# Patient Record
Sex: FEMALE | URBAN - METROPOLITAN AREA
[De-identification: names, ages, dates, MRNs, and addresses within clinical notes are randomized per-mention and may not be internally consistent; named-entity substitution may affect disease eponyms.]

---

## 2020-02-03 ENCOUNTER — IMPORTED ENCOUNTER (OUTPATIENT)
Dept: URBAN - METROPOLITAN AREA CLINIC 1 | Facility: CLINIC | Age: 25
End: 2020-02-03

## 2020-02-03 PROBLEM — B30.9: Noted: 2020-02-03

## 2020-02-03 PROBLEM — H17.823: Noted: 2020-02-03

## 2020-02-03 PROBLEM — H16.002: Noted: 2020-02-03

## 2020-02-03 PROCEDURE — 92002 INTRM OPH EXAM NEW PATIENT: CPT

## 2020-02-03 NOTE — PATIENT DISCUSSION
1.  Corneal ulcer/abrasion OS with possible viral component secondary to questionable () HERNANDEZ -- Likely from sleeping in CTLs/CTL overuse. Begin Besivance QID OS (sample and Written Rx given with GoodRx card). Written Rx given for Vigamox if cost is too much with Besivance. Return immediately if ulcer larger or symptoms worsen. ***No contact lens use until advised***2.  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 Return for an appointment in Thursday for a 10 with Dr. Bennett Christian.

## 2020-02-03 NOTE — PATIENT DISCUSSION
Viral Conjunctivitis OS -- The condition was discussed with the patient. Cool compresses and artificial tears were recommended. The mechanism of transmission was explained and the patient was educated to wash hands and use separate towels and bedding.

## 2021-03-04 ENCOUNTER — HOSPITAL ENCOUNTER (OUTPATIENT)
Dept: PHYSICAL THERAPY | Age: 26
Discharge: HOME | End: 2021-03-04
Payer: MEDICAID

## 2021-03-04 PROCEDURE — 97162 PT EVAL MOD COMPLEX 30 MIN: CPT

## 2021-03-04 NOTE — PROGRESS NOTES
PHYSICAL THERAPY - DAILY TREATMENT NOTE    Patient Name: Shreyas Lewis        Date: 3/4/2021  : 1995   yes Patient  Verified  Visit #:      of   12  Insurance: Payor: BLUE CROSS MEDICAID / Plan: [facility] HEALTHKEEPERS PLUS / Product Type: Managed Care Medicaid /      In time: 390 Out time: 615   Total Treatment Time: 45     Medicare/BCBS Time Tracking (below)   Total Timed Codes (min):  na 1:1 Treatment Time:  na     TREATMENT AREA =  Low back pain [M54.5]    SUBJECTIVE  Pain Level (on 0 to 10 scale):  7  / 10   Medication Changes/New allergies or changes in medical history, any new surgeries or procedures?    no  If yes, update Summary List   Subjective Functional Status/Changes:  []  No changes reported     SEE POC         OBJECTIVE    45 min Evaluation       Billed With/As:   [] TE   [] TA   [] Neuro   [] Self Care Patient Education: [x] Review HEP    [] Progressed/Changed HEP based on:   [] positioning   [] body mechanics   [] transfers   [] heat/ice application    [] other:      Other Objective/Functional Measures:    SEE POC     Post Treatment Pain Level (on 0 to 10) scale:   7  / 10     ASSESSMENT  Assessment/Changes in Function:     Evaluation Code Complexity: History MEDIUM  Complexity : 1-2 comorbidities / personal factors will impact the outcome/ POC ; Examination HIGH Complexity : 4+ Standardized tests and measures addressing body structure, function, activity limitation and / or participation in recreation ; Presentation MEDIUM Complexity : Evolving with changing characteristics ; Decision Making MEDIUM Complexity : FOTO score of 26-74;  Complexity MEDIUM    Justification for Eval Code Complexity:  Patient History : Depression, Pregnancy  Examination SEE ABOVE EXAM  Clinical Presentation: evolving pain  Clinical Decision Making : TQQL 41         []  See Progress Note/Recertification   Patient will continue to benefit from skilled PT services to modify and progress therapeutic interventions, address functional mobility deficits, address ROM deficits, address strength deficits, analyze and address soft tissue restrictions, analyze and cue movement patterns, analyze and modify body mechanics/ergonomics and assess and modify postural abnormalities to attain remaining goals. Progress toward goals / Updated goals:    SEE POC     PLAN  []  Upgrade activities as tolerated yes Continue plan of care   []  Discharge due to :    [x]  Other: Pt will be seen 2 times per week for 12 sessions.       Therapist: Rick Ang PT, DPT    Date: 3/4/2021 Time: 6:26 PM     Future Appointments   Date Time Provider Jodie Acevedo   3/12/2021 10:15 AM Carolyn Leon [facility]   3/16/2021  5:15 PM Radhika Martines, PT [facility]   3/23/2021  6:00 PM Radhika Martines PT [facility]   3/26/2021 10:15 AM Carolyn Loen [facility]   3/30/2021  5:15 PM Radhika Martines, PT [facility]   4/2/2021 10:15 AM Carolyn Leon [facility]   4/6/2021  5:15 PM Radhika Martines, PT [facility]   4/9/2021 10:15 AM Kevyn Howe, PTA [address]

## 2021-03-12 ENCOUNTER — HOSPITAL ENCOUNTER (OUTPATIENT)
Dept: PHYSICAL THERAPY | Age: 26
Discharge: HOME | End: 2021-03-12
Payer: MEDICAID

## 2021-03-12 PROCEDURE — 97530 THERAPEUTIC ACTIVITIES: CPT

## 2021-03-12 PROCEDURE — 97110 THERAPEUTIC EXERCISES: CPT

## 2021-03-12 PROCEDURE — 97112 NEUROMUSCULAR REEDUCATION: CPT

## 2021-03-12 NOTE — PROGRESS NOTES
PHYSICAL THERAPY - DAILY TREATMENT NOTE    Patient Name: Aundrea Hernández        Date: 3/12/2021  : 1995   yes Patient  Verified  Visit #:     Insurance: Payor: Marianna Katz / Plan: Caroline Miki PLUS / Product Type: Managed Care Medicaid /      In time:  Out time:    Total Treatment Time: 60     Medicare/BCBS Time Tracking (below)   Total Timed Codes (min):  50 1:1 Treatment Time:       TREATMENT AREA =  Low back pain [M54.5]    SUBJECTIVE  Pain Level (on 0 to 10 scale):  6  / 10   Medication Changes/New allergies or changes in medical history, any new surgeries or procedures?    no  If yes, update Summary List   Subjective Functional Status/Changes:  []  No changes reported     Pt reporting pain in the low back, (B) glutes, then into the (R) posterior thigh. OBJECTIVE  Modalities Rationale:     decrease pain and increase tissue extensibility to improve patient's ability to perform pain free ADLs. min [] Estim, type/location:                                      []  att     []  unatt     []  w/US     []  w/ice    []  w/heat    min []  Mechanical Traction: type/lbs                   []  pro   []  sup   []  int   []  cont    []  before manual    []  after manual    min []  Ultrasound, settings/location:      min []  Iontophoresis w/ dexamethasone, location:                                               []  take home patch       []  in clinic   10 min []  Ice     [x]  Heat    location/position: Semi-reclined, lumbar.      min []  Vasopneumatic Device, press/temp:     min []  Other:    [x] Skin assessment post-treatment (if applicable):    [x]  intact    []  redness- no adverse reaction     []redness  adverse reaction:        20 min Therapeutic Exercise:  [x]  See flow sheet   Rationale:      increase ROM, increase strength, improve coordination and improve balance to improve the patients ability to perform pain free ADLs.      7 (NB) Min Manual Therapy: STM/DTM (B) lumbar paraspinals in (R) sidelying. Rationale:      decrease pain, increase ROM, increase tissue extensibility and decrease trigger points to improve patient's ability to perform pain free ADLs. The manual therapy interventions were performed at a separate and distinct time from the therapeutic activities interventions. 10 min Therapeutic Activity: [x]  See flow sheet   Rationale:    increase strength, improve coordination, improve balance and increase proprioception to improve the patients ability to perform pain free ADLs. 13 min Neuromuscular Re-ed: [x]  See flow sheet   Rationale:    increase ROM, increase strength, improve coordination, improve balance and increase proprioception to improve the patients ability to perform pain free ADLs. Billed With/As:   [x] TE   [] TA   [] Neuro   [] Self Care Patient Education: [x] Review HEP    [] Progressed/Changed HEP based on:   [] positioning   [] body mechanics   [] transfers   [] heat/ice application    [] other:      Other Objective/Functional Measures: Therex per flow sheet. Post Treatment Pain Level (on 0 to 10) scale:   4  / 10     ASSESSMENT  Assessment/Changes in Function:     No exacerbation of symptoms with today's session. []  See Progress Note/Recertification   Patient will continue to benefit from skilled PT services to modify and progress therapeutic interventions, address functional mobility deficits, address ROM deficits, address strength deficits, analyze and address soft tissue restrictions, analyze and cue movement patterns, analyze and modify body mechanics/ergonomics and assess and modify postural abnormalities to attain remaining goals. Progress toward goals / Updated goals:    No change in progress toward LTG's with today's session.       PLAN  [x]  Upgrade activities as tolerated yes Continue plan of care   []  Discharge due to :    []  Other:      Therapist: Janny Meza PTA    Date: 3/12/2021 Time: 10:25 AM     Future Appointments   Date Time Provider Jodie Acevedo   3/16/2021  5:15 PM Adrien Cancino PT [facility]   3/23/2021  6:00 PM Adrien Cancino PT [facility]   3/26/2021 10[facility]15 [facility]   3/30/2021  5:15 PM Adrien Cancino PT [facility]   4/2/2021 10:15 AM Farhana [facility]   4/6/2021  5:15 PM Adrien Cancino PT [facility]   4/9/2021 10:15 AM Deborra Lesch, PTA [id number]

## 2021-03-16 ENCOUNTER — HOSPITAL ENCOUNTER (OUTPATIENT)
Dept: PHYSICAL THERAPY | Age: 26
Discharge: HOME | End: 2021-03-16
Payer: MEDICAID

## 2021-03-16 PROCEDURE — 97112 NEUROMUSCULAR REEDUCATION: CPT

## 2021-03-16 PROCEDURE — 97110 THERAPEUTIC EXERCISES: CPT

## 2021-03-16 PROCEDURE — 97530 THERAPEUTIC ACTIVITIES: CPT

## 2021-03-16 NOTE — PROGRESS NOTES
PHYSICAL THERAPY - DAILY TREATMENT NOTE    Patient Name: Tony Memory        Date: 3/16/2021  : 1995   yes Patient  Verified  Visit #:   3   of   12  Insurance: Payor: BLUE CROSS MEDICAID / Plan: [facility] [employer]KEEPERS PLUS / Product Type: Managed Care Medicaid /      In time: 5:15 Out time: 6:05   Total Treatment Time: 50     Medicare/BCBS Time Tracking (below)   Total Timed Codes (min):  50 1:1 Treatment Time:  50     TREATMENT AREA =  Low back pain [M54.5]    SUBJECTIVE  Pain Level (on 0 to 10 scale):  5  / 10 \"soreness in the low back\"   Medication Changes/New allergies or changes in medical history, any new surgeries or procedures?    no  If yes, update Summary List   Subjective Functional Status/Changes:  []  No changes reported     Pt reporting pain in the low back, (B) glutes, then into the (R) posterior thigh. OBJECTIVE  Modalities Rationale:     decrease pain and increase tissue extensibility to improve patient's ability to perform pain free ADLs. min [] Estim, type/location:                                      []  att     []  unatt     []  w/US     []  w/ice    []  w/heat    min []  Mechanical Traction: type/lbs                   []  pro   []  sup   []  int   []  cont    []  before manual    []  after manual    min []  Ultrasound, settings/location:      min []  Iontophoresis w/ dexamethasone, location:                                               []  take home patch       []  in clinic   10 min []  Ice     [x]  Heat    location/position: During table drills, lumbar.      min []  Vasopneumatic Device, press/temp:     min []  Other:    [x] Skin assessment post-treatment (if applicable):    [x]  intact    []  redness- no adverse reaction     []redness - adverse reaction:        20 min Therapeutic Exercise:  [x]  See flow sheet   Rationale:      increase ROM, increase strength, improve coordination and improve balance to improve the patients ability to perform pain free ADLs.     15 min Neuromuscular Re-ed: [x]  See flow sheet   Rationale:    improve coordination to improve the patients ability to move legs/hips to perform bed mobility and transfers     5 (NB) Min Manual Therapy: STM/DTM (B) lumbar paraspinals in (R) sidelying. Rationale:      decrease pain, increase ROM, increase tissue extensibility and decrease trigger points to improve patient's ability to perform pain free ADLs. The manual therapy interventions were performed at a separate and distinct time from the therapeutic activities interventions. 10 min Therapeutic Activity: [x]  See flow sheet   Rationale:    increase strength, improve coordination, improve balance and increase proprioception to improve the patients ability to perform bed transfers, chair transfers    Billed With/As:   [x] TE   [] TA   [] Neuro   [] Self Care Patient Education: [x] Review HEP    [] Progressed/Changed HEP based on:   [] positioning   [] body mechanics   [] transfers   [] heat/ice application    [] other:      Other Objective/Functional Measures:    TTP to bilateral superior glutes, just lateral to PSIS. See Flowsheet for drills, loads and volume. Post Treatment Pain Level (on 0 to 10) scale:   5  / 10     ASSESSMENT  Assessment/Changes in Function:     Pt with poor tolerance to hip motions and reporting anterior pelvic and posterior pelvic (PSIS/SIJ area) pain. Supine piriformis and LTRs reported as improving to pt status. Gave pt tband for HEP. Tender points noted at mid right glute and piriformis area which subsided with prolonged pressure. Instructed pt in using pool as way to reduce stresses on the body. She was receptive.      []  See Progress Note/Recertification   Patient will continue to benefit from skilled PT services to modify and progress therapeutic interventions, address functional mobility deficits, address ROM deficits, address strength deficits, analyze and address soft tissue restrictions, analyze and cue movement patterns, analyze and modify body mechanics/ergonomics and assess and modify postural abnormalities to attain remaining goals. Progress toward goals / Updated goals: · Short Term Goals: To be accomplished in  6  treatments:  Pt will be I with HEP in order to improve carryover from therapy.: MET: per pt. Pt will report max pain <5/10 in the lumbar spine in order to improve sleep and tolerance with functional ADL's  · Long Term Goals: To be accomplished in  12  treatments:  Pt will report 50% improvement with sleeping in order to improve QOL  Pt will reports <3/10 max pain of the L/S in order to improve functional ADL's  Pt will be able to perform sit to stand transfers with no use of UE support in order to get in and out of chairs at doctors appointments. Progressing, uses both hands for table transfer, 1 UE for chair  Pt will be I with long term HEP in order to perform self management of symptoms at home.        PLAN  [x]  Upgrade activities as tolerated yes Continue plan of care   []  Discharge due to :    []  Other:      Therapist: Dwaine Vu PT    Date: 3/16/2021 Time: 10:25 AM     Future Appointments   Date Time Provider Jodie Acevedo   3/23/2021  6:00 PM Safia Callahan  [address] [facility]   3/26/2021 10:15 AM Levistephanie Monteiro [address] [facility]   3/30/2021  5:15 PM Safia Callahan  [address] [facility]   4/2/2021 10:15 AM Levi Thania [address] [facility]   4/6/2021  5:15 PM Safia Callahan  [address] [facility]   4/9/2021 10:15 AM Jarvis Nyhan, PTA [address]

## 2021-03-23 ENCOUNTER — APPOINTMENT (OUTPATIENT)
Dept: PHYSICAL THERAPY | Age: 26
End: 2021-03-23
Payer: MEDICAID

## 2021-04-07 NOTE — PROGRESS NOTES
[facility] PHYSICAL THERAPY  [address] [place] [address] Beatriz Buchanan [facility] [address],[place], [facility] - Phone: [phone]  Fax: [phone]    DISCHARGE NOTE  Patient Name: Ruthanne Lennox : 1995   Treatment/Medical Diagnosis: Low back pain [M54.5]   Referral Source: Celestina Miller MD     Date of Initial Visit: 3/4/21 Attended Visits: 3 Missed Visits: 4       SUMMARY OF TREATMENT  Pt attended only initial evaluation and     2     follow-ups and then did not return. Therefore a formal reassessment of goals was not performed. Moderate gains seen in clinic with some short term relief with some positions. Pt contacted and she reports she wishes self-DC      RECOMMENDATIONS  Discontinue physical therapy due to patient not returning. If you have any questions/comments please contact us directly at [phone]. Thank you for allowing us to assist in the care of your patient.     Therapist Signature: Herminio Pena PT Date: 21     Time: 3:28 PM

## 2021-04-09 ENCOUNTER — APPOINTMENT (OUTPATIENT)
Dept: PHYSICAL THERAPY | Age: 26
End: 2021-04-09

## 2021-05-17 NOTE — PROGRESS NOTES
[address] [place] [address] PHYSICAL THERAPY  [address] [place] [address],[facility], 70 [facility] - Phone: [phone]  Fax: [phone]    DISCHARGE NOTE  Patient Name: Jaja Sumner : 1995   Treatment/Medical Diagnosis: Low back pain [M54.5]   Referral Source: Ancelmo Perez MD     Date of Initial Visit: 3/4/21 Attended Visits: 3 Missed Visits: 4       SUMMARY OF TREATMENT  Pt attended only initial evaluation and     3     follow-ups and then did not return. Pt indicates she is no longer interested in PT at this time. Therefore a formal reassessment of goals was not performed. RECOMMENDATIONS  Discontinue physical therapy due to patient not returning. If you have any questions/comments please contact us directly at [phone]. Thank you for allowing us to assist in the care of your patient. Therapist Signature: Yolanda Damon PT Date: 21     Time: 1:17 PM   NOTE TO PHYSICIAN:  Your patient's insurance requires this discharge note be signed and returned. PLEASE COMPLETE THE ORDERS BELOW AND RETURN TO:  [facility] PHYSICAL THERAPY    ___ I have read the above report and request that my patient be discharged from therapy.      Physician Signature:        Date:       Time:    Ancelmo Perez MD

## 2022-04-02 ASSESSMENT — VISUAL ACUITY
OS_SC: 20/20-1
OD_SC: 20/20-1